# Patient Record
Sex: MALE | Race: WHITE | NOT HISPANIC OR LATINO | Employment: FULL TIME | RURAL
[De-identification: names, ages, dates, MRNs, and addresses within clinical notes are randomized per-mention and may not be internally consistent; named-entity substitution may affect disease eponyms.]

---

## 2018-05-26 ENCOUNTER — OFFICE VISIT (OUTPATIENT)
Dept: RETAIL CLINIC | Facility: CLINIC | Age: 33
End: 2018-05-26

## 2018-05-26 VITALS
RESPIRATION RATE: 16 BRPM | HEIGHT: 73 IN | WEIGHT: 140.8 LBS | HEART RATE: 87 BPM | OXYGEN SATURATION: 98 % | BODY MASS INDEX: 18.66 KG/M2 | TEMPERATURE: 97.1 F

## 2018-05-26 DIAGNOSIS — J06.9 UPPER RESPIRATORY INFECTION, VIRAL: Primary | ICD-10-CM

## 2018-05-26 LAB
EXPIRATION DATE: NORMAL
INTERNAL CONTROL: NORMAL
Lab: NORMAL
S PYO AG THROAT QL: NEGATIVE

## 2018-05-26 PROCEDURE — 87880 STREP A ASSAY W/OPTIC: CPT | Performed by: NURSE PRACTITIONER

## 2018-05-26 PROCEDURE — 99203 OFFICE O/P NEW LOW 30 MIN: CPT | Performed by: NURSE PRACTITIONER

## 2018-05-26 NOTE — PROGRESS NOTES
"Fabrizio Dubose is a 32 y.o. male.   Chief Complaint   Patient presents with   • Sore Throat   • URI      33 yo w/m presents with complaint of congestion, sore throat, and feeling bad duration of one week.  He reports feeling better today.  Denies fever.  Sore throat aggravated by nothing and alleviated by warm liquids, resolved today.  See ros for additional information.      Sore Throat    This is a new problem. The current episode started in the past 7 days. The problem has been gradually improving. Neither side of throat is experiencing more pain than the other. There has been no fever. The pain is at a severity of 0/10. Associated symptoms include congestion. Pertinent negatives include no ear discharge, ear pain or trouble swallowing. He has tried cool liquids for the symptoms. The treatment provided moderate relief.   URI    This is a new problem. The current episode started in the past 7 days. The problem has been gradually improving. There has been no fever. Associated symptoms include congestion and a sore throat. Pertinent negatives include no ear pain, rhinorrhea, sinus pain or sneezing. He has tried nothing for the symptoms.        The following portions of the patient's history were reviewed and updated as appropriate: allergies, current medications, past family history, past medical history, past social history, past surgical history and problem list.  No current outpatient prescriptions on file.    Review of Systems   Constitutional: Negative.    HENT: Positive for congestion and sore throat. Negative for ear discharge, ear pain, rhinorrhea, sinus pain, sinus pressure, sneezing and trouble swallowing.    Eyes: Negative.    Respiratory: Negative.    Cardiovascular: Negative.    Gastrointestinal: Negative.    Musculoskeletal: Negative.      Pulse 87   Temp 97.1 °F (36.2 °C) (Temporal Artery )   Resp 16   Ht 185.4 cm (73\")   Wt 63.9 kg (140 lb 12.8 oz)   SpO2 98%   BMI 18.58 kg/m² "     Objective   Allergies   Allergen Reactions   • Phenergan [Promethazine] Other (See Comments)     UNKNOWN ALLERGY       Physical Exam   Constitutional: He is oriented to person, place, and time. He appears well-developed and well-nourished.   HENT:   Head: Normocephalic.   Right Ear: External ear normal.   Left Ear: External ear normal.   Nose: Nose normal.   Mouth/Throat: Oropharynx is clear and moist.   Eyes: Conjunctivae are normal.   Neck: Normal range of motion. Neck supple. No JVD present. No tracheal deviation present. No thyromegaly present.   Cardiovascular: Normal rate, regular rhythm and normal heart sounds.    Pulmonary/Chest: Effort normal and breath sounds normal. No stridor. No respiratory distress. He has no wheezes. He has no rales. He exhibits no tenderness.   Lymphadenopathy:     He has no cervical adenopathy.   Neurological: He is alert and oriented to person, place, and time.   Skin: Skin is warm. Capillary refill takes less than 2 seconds.   Psychiatric: He has a normal mood and affect. His behavior is normal.   Vitals reviewed.      Assessment/Plan   Tobin was seen today for sore throat and uri.    Diagnoses and all orders for this visit:    Upper respiratory infection, viral  -     POC Rapid Strep A      Results for orders placed or performed in visit on 05/26/18   POC Rapid Strep A   Result Value Ref Range    Rapid Strep A Screen Negative Negative, VALID, INVALID, Not Performed    Internal Control Passed Passed    Lot Number [id number]     Expiration Date 11,302,019                An After Visit Summary was printed, reviewed, and given to the patient. Understanding verbalized and agrees with treatment plan.  If no improvement or becomes worse, follow up with primary or go to [facility]/ER.     May 26, 2018 2:18 PM

## 2023-05-22 ENCOUNTER — OFFICE VISIT (OUTPATIENT)
Dept: CARDIOLOGY | Facility: CLINIC | Age: 38
End: 2023-05-22
Payer: COMMERCIAL

## 2023-05-22 VITALS
SYSTOLIC BLOOD PRESSURE: 128 MMHG | OXYGEN SATURATION: 97 % | BODY MASS INDEX: 18.02 KG/M2 | HEIGHT: 73 IN | DIASTOLIC BLOOD PRESSURE: 80 MMHG | HEART RATE: 86 BPM | WEIGHT: 136 LBS

## 2023-05-22 DIAGNOSIS — R00.2 PALPITATIONS: ICD-10-CM

## 2023-05-22 DIAGNOSIS — R06.02 SHORTNESS OF BREATH: ICD-10-CM

## 2023-05-22 DIAGNOSIS — R07.2 PRECORDIAL CHEST PAIN: Primary | ICD-10-CM

## 2023-05-22 DIAGNOSIS — R63.4 WEIGHT LOSS, ABNORMAL: ICD-10-CM

## 2023-05-22 PROCEDURE — 99204 OFFICE O/P NEW MOD 45 MIN: CPT | Performed by: INTERNAL MEDICINE

## 2023-05-22 PROCEDURE — 93000 ELECTROCARDIOGRAM COMPLETE: CPT | Performed by: INTERNAL MEDICINE

## 2023-05-22 RX ORDER — BUPRENORPHINE AND NALOXONE 8; 2 MG/1; MG/1
FILM, SOLUBLE BUCCAL; SUBLINGUAL
COMMUNITY

## 2023-05-22 RX ORDER — NALOXEGOL OXALATE 25 MG/1
TABLET, FILM COATED ORAL
COMMUNITY

## 2023-05-22 RX ORDER — PANTOPRAZOLE SODIUM 40 MG/1
TABLET, DELAYED RELEASE ORAL
COMMUNITY

## 2023-05-22 NOTE — PROGRESS NOTES
[facility] Provider Note     Date:   2023   Name: Tobin Dubose  :   1985  PCP: Jeny Young MD    Chief Complaint   Patient presents with   • Establish Care   • Chest Pain       Subjective     History of Present Illness  Toibn Dubose is a 37 y.o. male who presents today for chest pain  All started after Covid Oct 2022, then subsequent flu afterwards. Appetite changed, losing weight, about 20lbs thus far. And episodes of chest pain. He states that his chest was most recently tight all day Saturday. Almost left work that day.. Woke up in middle of the night another night after vaping with heart pounding, got hot and then went numb and broke out in a sweat. Another time he had chest pain at night without vaping and heart was pounding again. Almost like a panic attack according to him. Bp that night 147/91 HR 53. One episode during work that felt like a panic attack, chest pumping, couldn't breath. And short of breath other times at work too. No syncope.  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 Mild cough. Long term protonix use, no EGD but has had colonoscopy.     No specialty comments available.    Allergies   Allergen Reactions   • Mupirocin Rash   • Phenergan [Promethazine Hcl] Other (See Comments)   • Phenergan [Promethazine] Other (See Comments)     UNKNOWN ALLERGY       Current Outpatient Medications:   •  buprenorphine-naloxone (SUBOXONE) 8-2 MG film film, buprenorphine 8 mg-naloxone 2 mg sublingual film, Disp: , Rfl:   •  Naloxegol Oxalate (Movantik) 25 MG tablet, Movantik 25 mg tablet, Disp: , Rfl:   •  pantoprazole (PROTONIX) 40 MG EC tablet, pantoprazole 40 mg tablet,delayed release, Disp: , Rfl:   •  sertraline (ZOLOFT) 50 MG tablet, sertraline 50 mg tablet, Disp: , Rfl:     Past Medical History:   Diagnosis Date   • GERD (gastroesophageal reflux disease)       Past Surgical History:   Procedure Laterality Date   • EAR  "TUBES       History reviewed. No pertinent family history.  Social History     Socioeconomic History   • Marital status: Single   Tobacco Use   • Smoking status: Never       Objective     Vital Signs:  /80 (BP Location: Left arm)   Pulse 86   Ht 185.4 cm (73\")   Wt 61.7 kg (136 lb)   SpO2 97%   BMI 17.94 kg/m²   Estimated body mass index is 17.94 kg/m² as calculated from the following:    Height as of this encounter: 185.4 cm (73\").    Weight as of this encounter: 61.7 kg (136 lb).       BMI is below normal parameters (malnutrition). Recommendations: treating the underlying disease process      Physical Exam  Constitutional:       Appearance: Normal appearance. He is well-developed.   HENT:      Head: Normocephalic and atraumatic.   Eyes:      General: No scleral icterus.     Pupils: Pupils are equal, round, and reactive to light.   Neck:      Vascular: No carotid bruit.   Cardiovascular:      Rate and Rhythm: Normal rate and regular rhythm.      Pulses: Normal pulses.           Radial pulses are 2+ on the right side and 2+ on the left side.        Dorsalis pedis pulses are 2+ on the right side and 2+ on the left side.        Posterior tibial pulses are 2+ on the right side and 2+ on the left side.      Heart sounds: Normal heart sounds. No murmur heard.  Pulmonary:      Breath sounds: Normal breath sounds. No wheezing or rhonchi.   Musculoskeletal:      Right lower leg: No edema.      Left lower leg: No edema.   Skin:     Capillary Refill: Capillary refill takes less than 2 seconds.      Coloration: Skin is not cyanotic.      Nails: There is no clubbing.   Neurological:      Mental Status: He is alert and oriented to person, place, and time.      Motor: No weakness.      Gait: Gait normal.   Psychiatric:         Mood and Affect: Mood normal.         Behavior: Behavior is cooperative.         Thought Content: Thought content normal.         Cognition and Memory: Memory normal.                 ECG 12 " Lead    Date/Time: 5/22/2023 9:59 AM  Performed by: Mary Huber MD  Authorized by: Mary Huber MD   Comparison: not compared with previous ECG   Previous ECG: no previous ECG available  Rhythm: sinus bradycardia and sinus arrhythmia  Rate: normal  Conduction: conduction normal  ST Segments: ST segments normal  T Waves: T waves normal  QRS axis: normal  Other: no other findings    Clinical impression: normal ECG             Assessment and Plan     Diagnoses and all orders for this visit:    1. Precordial chest pain (Primary)  Comments:  Atypical for cardiac.  We will start with cardiac work-up and go from there.  Possibly GI.  Or lung related.  Orders:  -     ECG 12 Lead  -     Adult Transthoracic Echo Complete W/ Cont if Necessary Per Protocol; Future  -     Holter Monitor - 72 Hour Up To 15 Days  -     Adult Stress Echo W/ Cont or Stress Agent if Necessary Per Protocol; Future  -     CT Chest With Contrast Diagnostic; Future    2. Shortness of breath  -     Adult Transthoracic Echo Complete W/ Cont if Necessary Per Protocol; Future  -     Holter Monitor - 72 Hour Up To 15 Days  -     Adult Stress Echo W/ Cont or Stress Agent if Necessary Per Protocol; Future  -     CT Chest With Contrast Diagnostic; Future    3. Palpitations  -     Holter Monitor - 72 Hour Up To 15 Days    4. Weight loss, abnormal  Comments:  With asbestos exposure plan CT chest.  Orders:  -     CT Chest With Contrast Diagnostic; Future        Recommendations: ER if symptoms increase and Report if any new/changing symptoms immediately          Follow Up  No follow-ups on file.  Patient was given instructions and counseling regarding his condition or for health maintenance advice. Please see specific information pulled into the AVS if appropriate.

## 2023-05-24 ENCOUNTER — TELEPHONE (OUTPATIENT)
Dept: CARDIOLOGY | Facility: CLINIC | Age: 38
End: 2023-05-24
Payer: COMMERCIAL

## 2023-05-24 NOTE — TELEPHONE ENCOUNTER
Patient is unable to do is CT at [facility] due to cost. He was wondering if order could be sent to [facility]?

## 2023-06-07 DIAGNOSIS — R06.02 SHORTNESS OF BREATH: ICD-10-CM

## 2023-06-07 DIAGNOSIS — R07.2 PRECORDIAL CHEST PAIN: ICD-10-CM

## 2023-06-07 DIAGNOSIS — R63.4 WEIGHT LOSS, ABNORMAL: ICD-10-CM

## 2023-08-02 ENCOUNTER — OFFICE VISIT (OUTPATIENT)
Dept: CARDIOLOGY | Facility: CLINIC | Age: 38
End: 2023-08-02
Payer: COMMERCIAL

## 2023-08-02 VITALS
HEART RATE: 86 BPM | SYSTOLIC BLOOD PRESSURE: 136 MMHG | DIASTOLIC BLOOD PRESSURE: 86 MMHG | OXYGEN SATURATION: 98 % | HEIGHT: 73 IN | WEIGHT: 137 LBS | BODY MASS INDEX: 18.16 KG/M2

## 2023-08-02 DIAGNOSIS — R00.2 PALPITATIONS: Primary | ICD-10-CM

## 2023-08-02 DIAGNOSIS — R63.4 WEIGHT LOSS, ABNORMAL: ICD-10-CM

## 2023-08-02 PROCEDURE — 99214 OFFICE O/P EST MOD 30 MIN: CPT | Performed by: INTERNAL MEDICINE

## 2023-08-02 RX ORDER — BISOPROLOL FUMARATE 5 MG/1
5 TABLET, FILM COATED ORAL DAILY
Qty: 30 TABLET | Refills: 11 | Status: SHIPPED | OUTPATIENT
Start: 2023-08-02

## 2023-08-09 ENCOUNTER — TELEPHONE (OUTPATIENT)
Dept: CARDIOLOGY | Facility: CLINIC | Age: 38
End: 2023-08-09
Payer: COMMERCIAL

## 2023-08-09 NOTE — TELEPHONE ENCOUNTER
"Called pt back with his concerns over new med started.  Seen by Dr. Huber on 8/2/2023 and given Bisoprolol 5mg daily for palpitations.  Took his first dose on Friday, 8/4/2023.  Shortly thereafter states his heart rate dropped down to 45 and \"just did not feel right\".  Hasn't taken any since and states he won't take this again.  Follow up scheduled for 10/11/2023.  Please advise.  "

## 2023-08-09 NOTE — TELEPHONE ENCOUNTER
Patient called and left a voicemail stating that he is unable to take the medication he was recently prescribed and he would like to speak to someone regarding this. Please call back at [phone].

## 2023-08-10 NOTE — TELEPHONE ENCOUNTER
HUB OK TO READ: Attempted to call patient, unable to reach. Would patient be okay with taking half tablet of the bisoprolol to see if that would make symptoms better? The full dose might be too much and the half dose might be better.

## 2023-08-15 NOTE — TELEPHONE ENCOUNTER
Called patient and he agreed to the half tablet of Bisoprolol. He's going to try this strength and let us know how it works. I advised him to call us back and let us know how he's doing on the half dose. Thanks.

## 2025-05-06 ENCOUNTER — HOSPITAL ENCOUNTER (OUTPATIENT)
Dept: HOSPITAL 22 - LAB.DROPOF | Age: 40
Discharge: HOME | End: 2025-05-06
Payer: COMMERCIAL

## 2025-05-06 DIAGNOSIS — Z11.3: Primary | ICD-10-CM

## 2025-05-06 PROCEDURE — 87661 TRICHOMONAS VAGINALIS AMPLIF: CPT

## 2025-05-06 PROCEDURE — 87591 N.GONORRHOEAE DNA AMP PROB: CPT

## 2025-05-06 PROCEDURE — 87491 CHLMYD TRACH DNA AMP PROBE: CPT
